# Patient Record
(demographics unavailable — no encounter records)

---

## 2025-01-06 NOTE — HISTORY OF PRESENT ILLNESS
[Patient reported PAP Smear was normal] : Patient reported PAP Smear was normal [HIV test declined] : HIV test declined [Syphilis test declined] : Syphilis test declined [Gonorrhea test offered] : Gonorrhea test offered [Chlamydia test offered] : Chlamydia test offered [Trichomonas test offered] : Trichomonas test offered [HPV test offered] : HPV test offered [Hepatitis B test declined] : Hepatitis B test declined [Hepatitis C test declined] : Hepatitis C test declined [IUD] : has an intrauterine device [Y] : Positive pregnancy history [TextBox_4] : 29 yo  for well woman exam.   No hx abnormal pap smears, abnormal bleeding, fibroids.  Hx ovarian cyst No urinary complaints.  Past medical, surgical, social and family hx reviewed. Paragard IUD in situ x 1 year, occasional spotting noted.  Considering another child in near future.  Reports some discomfort in vagina with intercourse initially. [PapSmeardate] : 2020 [LMPDate] : 12/21/24 [de-identified] : paragard [PGxTotal] : 1 [[facility] : [facility] [[facility]] : 1

## 2025-01-06 NOTE — REVIEW OF SYSTEMS
[Patient Intake Form Reviewed] : Patient intake form was reviewed [FreeTextEntry8] : vaginal pain with intercourse

## 2025-01-06 NOTE — DISCUSSION/SUMMARY
[FreeTextEntry1] : Unremarkable CBE and pelvic exam Discomfort in vagina may be from scar tissue secondary to vaginal tear repair advise lubricant and massage If not resolving may consider pelvic floor PT SBE reviewed Pap and HPV collected Healthy diet, exercise, sleep hygiene discussed Patient to begin PNV when considering conception and RTO for removal of Paragard IUD No other gyn concerns today RTO x 1 year or prn

## 2025-01-21 NOTE — HISTORY OF PRESENT ILLNESS
[Y] : Patient is sexually active [FreeTextEntry1] : 30 yr old P1 with Paragard IUD in place here to have IUD removed. Patient is interested in conceiving. Discussed starting prenatal vitamins. Patient is Jehovah"s witness and will plan on delivery at [facility] [LMPDate] : 01/16/25

## 2025-06-11 NOTE — OB HISTORY
[Definite:  ___ (Date)] : the last menstrual period was [unfilled] [Normal Amount/Duration] : was of a normal amount and duration [Regular Cycle Intervals] : periods have been regular [Pregnancy History] : girl [LMP: ___] : LMP: [unfilled] [ABHIJIT: ___] : ABHIJIT: [unfilled] [EGA: ___ wks] : EGA: [unfilled] wks [Spontaneous] : Spontaneous conception [Spotting Between  Menses] : no spotting between menses [___] : no pregnancy complications reported [FreeTextEntry1] : First prenatal visit was on 3/4/2025.

## 2025-06-11 NOTE — VITALS
[LMP (date): ___] : LMP was on [unfilled] [GA= ___ Days] : and [unfilled] day(s) [GA =___ Weeks] : which calculates to a GA of [unfilled] weeks [ABHIJIT by LMP (date): ___] : The calculated ABHIJIT by LMP is [unfilled] [By LMP] : this is the final ABHIJIT

## 2025-06-11 NOTE — DISCUSSION/SUMMARY
[FreeTextEntry1] : We had the pleasure of seeing Ms. Perry for a Maternal-Fetal Medicine consultation today. She is a at 21 weeks and 4 days of gestation with a history of asthma and Hashimoto's thyroiditis, presenting for MFM consultation due to refusal of blood transfusions/products.   She is a Mosque. She was seen here during her prior pregnancy in 2023 and had an uncomplicated vaginal delivery at that time. She does not have a known bleeding disorder and does not have anemia based on her last labs. She denies having a bleeding event in the past where physicians suggested a blood transfusion.  I discussed the Blood Product Preference form with the patient. She outlined her wishes with regard to which blood products and derivatives she will or will not accept should the need arise during her delivery hospitalization. She understands the risk of refusing blood products and understands that blood alternatives and derivatives may not provide the same maternal benefit as a direct blood product. She indicated that she prefers to place her life at risk and potentially die, rather than accept a product on her "will not accept" list. I recommended having a consultation Hematology and with the [facility] obstetrical Anesthesia team. I recommend she be given Tranexamic acid for postpartum hemorrhage prophylaxis at delivery. She was in agreement with this recommendation for prophylactic treatment. She was given a copy of the Blood Product Preference form that she signed. CBC was sent today to assess for development of anemia in the pregnancy.   She was diagnosed with Hashimoto's thyroiditis during 2015. She received treatment with Synthroid for about a year, which was then discontinued as her thyroid function normalized. She had a TSH in March 2025 which was normal, but T3/T4 have not been recently assessed. While overt maternal hypothyroidism has been associated with increased risk of adverse pregnancy complications and abnormal neurocognitive development, pregnancies complicated by thyroid disease that have normal thyroid function are usually not associated with adverse pregnancy outcomes. Thus, maintaining optimal thyroid function is essential. A full thyroid panel was sent today.   Lastly, she has mild asthma, for which she has an albuterol inhaler that she last needed to use 1.5 years ago. Asthma is characterized by chronic airway inflammation, with increased air-way responsiveness to a variety of stimuli, and airway obstruction that is partially or completely reversible. Exacerbation of asthma occurs in about 1 in 4 pregnancies. The likelihood of a severe exacerbation is strongly correlated with the severity of asthma outside of pregnancy. Complications of uncontrolled asthma may include prematurity, hypertensive disorders, and low birthweight infants. Asthma that is well controlled is unlikely to be associated with pregnancy complications.  At the end of our discussion, the patient indicated that her questions were answered, and she seemed satisfied with our discussion.

## 2025-06-11 NOTE — ACTIVE PROBLEMS
[Diabetes Mellitus] : no diabetes mellitus [Hypertension] : no hypertension [Heart Disease] : no heart disease [Renal Disease] : no kidney disease, no UTI [Neurologic Disorder] : no neurologic disorder, no epilepsy [Depression] : no depression, no post partum depression [Psychiatric Disorders] : no psychiatric disorders [Hepatic Disorder] : no hepatitis, no liver disease [Thrombophlebitis] : no varicosities, no phlebitis [Trauma] : no trauma/violence [Blood Transfusion (___ Ml)] : no history of blood transfusion [Thyroid Disorder] : no thyroid dysfunction

## 2025-06-11 NOTE — FAMILY HISTORY
[Age 35+ During Pregnancy] : not 35 or over during pregnancy [Reported Family History Of Birth Defects] : no congenital heart defects [Fritz-Sachs Carrier] : no Fritz-Sachs [Family History] : no mental retardation/autism [Reported Family History Of Genetic Disease] : no maternal metabolic disorder

## 2025-07-30 NOTE — PHYSICAL EXAM
[Fully active, able to carry on all pre-disease performance without restriction] : Status 0 - Fully active, able to carry on all pre-disease performance without restriction [Normal] : affect appropriate [de-identified] : Gravid

## 2025-07-30 NOTE — REVIEW OF SYSTEMS
[Fatigue] : fatigue [SOB on Exertion] : shortness of breath during exertion [Diarrhea: Grade 0] : Diarrhea: Grade 0 [Fever] : no fever [Chills] : no chills [Night Sweats] : no night sweats [Vision Problems] : no vision problems [Loss of Hearing] : no loss of hearing [Chest Pain] : no chest pain [Palpitations] : no palpitations [Lower Ext Edema] : no lower extremity edema [Shortness Of Breath] : no shortness of breath [Wheezing] : no wheezing [Cough] : no cough [Abdominal Pain] : no abdominal pain [Vomiting] : no vomiting [Constipation] : no constipation [Joint Pain] : no joint pain [Joint Stiffness] : no joint stiffness [Muscle Pain] : no muscle pain [Skin Rash] : no skin rash [Skin Wound] : no skin wound [Dizziness] : no dizziness [Fainting] : no fainting [Difficulty Walking] : no difficulty walking [Insomnia] : no insomnia [Anxiety] : no anxiety [Depression] : no depression [Easy Bleeding] : no tendency for easy bleeding [Easy Bruising] : no tendency for easy bruising [Swollen Glands] : no swollen glands [FreeTextEntry4] : occasional bleeding gums [FreeTextEntry8] : 28 weeks gestation, did have heavy menses prior due to copper IUD

## 2025-07-30 NOTE — REVIEW OF SYSTEMS
[Fever] : no fever [Chills] : no chills [Night Sweats] : no night sweats [Fatigue] : fatigue [Vision Problems] : no vision problems [Loss of Hearing] : no loss of hearing [Chest Pain] : no chest pain [Palpitations] : no palpitations [Lower Ext Edema] : no lower extremity edema [Shortness Of Breath] : no shortness of breath [Wheezing] : no wheezing [Cough] : no cough [SOB on Exertion] : shortness of breath during exertion [Abdominal Pain] : no abdominal pain [Vomiting] : no vomiting [Constipation] : no constipation [Diarrhea: Grade 0] : Diarrhea: Grade 0 [Joint Pain] : no joint pain [Joint Stiffness] : no joint stiffness [Muscle Pain] : no muscle pain [Skin Rash] : no skin rash [Skin Wound] : no skin wound [Dizziness] : no dizziness [Fainting] : no fainting [Difficulty Walking] : no difficulty walking [Insomnia] : no insomnia [Anxiety] : no anxiety [Depression] : no depression [Easy Bleeding] : no tendency for easy bleeding [Easy Bruising] : no tendency for easy bruising [Swollen Glands] : no swollen glands [FreeTextEntry4] : occasional bleeding gums [FreeTextEntry8] : 28 weeks gestation, did have heavy menses prior due to copper IUD

## 2025-07-30 NOTE — CONSULT LETTER
[Dear  ___] : Dear  [unfilled], [Consult Letter:] : I had the pleasure of evaluating your patient, [unfilled]. [Please see my note below.] : Please see my note below. [Consult Closing:] : Thank you very much for allowing me to participate in the care of this patient.  If you have any questions, please do not hesitate to contact me. [Sincerely,] : Sincerely, [FreeTextEntry3] : Elizabet Frances, JOSHUA, ANP-c

## 2025-07-30 NOTE — RESULTS/DATA
[FreeTextEntry1] : CODY GUERRA is a 31 year--old female who presents for initial evaluation of anemia, suspect iron deficiency.

## 2025-07-30 NOTE — HISTORY OF PRESENT ILLNESS
[de-identified] : Referred by: OB/GYN   CODY GUERRA presented at age 31 year on 2025 for evaluation of anemia in the setting of pregnancy. She is currently 28 weeks gestation with her second child. She did have iron deficiency in  with her first pregnancy and required IV Venofer. She reports heavy menses prior to getting pregnant. Denies dark stools, hematuria or blood per rectum. Pt does not accept blood/blood products.  Laboratory studies from 25 reviewed and notable for: HGB= 10.8, HCT= 35.1, WBC= 8.38, Plt= 173   HCM: - Colonoscopy: NO - Gyn: Yes - Mammo: No   SH: - Occupation: Stay home mom - Living situation: Lives with  and daughter - Smoking/Etoh/illicit drugs: Rare ETOH   FH: Maternal Grandmother-  from COPD Maternal Grandfather- , CVA Paternal Grandmother- alive, age 84, dementia Paternal Grandfather- unknown Mother- Alive, age 57, high cholesterol Father- alive age 61, healthy 1 brother- alive age 27, healthy 1 daughter- alive age 2, healthy

## 2025-07-30 NOTE — PHYSICAL EXAM
[Fully active, able to carry on all pre-disease performance without restriction] : Status 0 - Fully active, able to carry on all pre-disease performance without restriction [Normal] : affect appropriate [de-identified] : Gravid

## 2025-07-30 NOTE — HISTORY OF PRESENT ILLNESS
[de-identified] : Referred by: OB/GYN   CODY GUERRA presented at age 31 year on 2025 for evaluation of anemia in the setting of pregnancy. She is currently 28 weeks gestation with her second child. She did have iron deficiency in  with her first pregnancy and required IV Venofer. She reports heavy menses prior to getting pregnant. Denies dark stools, hematuria or blood per rectum. Pt does not accept blood/blood products.  Laboratory studies from 25 reviewed and notable for: HGB= 10.8, HCT= 35.1, WBC= 8.38, Plt= 173   HCM: - Colonoscopy: NO - Gyn: Yes - Mammo: No   SH: - Occupation: Stay home mom - Living situation: Lives with  and daughter - Smoking/Etoh/illicit drugs: Rare ETOH   FH: Maternal Grandmother-  from COPD Maternal Grandfather- , CVA Paternal Grandmother- alive, age 84, dementia Paternal Grandfather- unknown Mother- Alive, age 57, high cholesterol Father- alive age 61, healthy 1 brother- alive age 27, healthy 1 daughter- alive age 2, healthy